# Patient Record
Sex: FEMALE | Race: WHITE | NOT HISPANIC OR LATINO | URBAN - METROPOLITAN AREA
[De-identification: names, ages, dates, MRNs, and addresses within clinical notes are randomized per-mention and may not be internally consistent; named-entity substitution may affect disease eponyms.]

---

## 2018-05-03 ENCOUNTER — OFFICE (OUTPATIENT)
Dept: URBAN - METROPOLITAN AREA CLINIC 101 | Facility: CLINIC | Age: 38
End: 2018-05-03

## 2018-05-03 VITALS
DIASTOLIC BLOOD PRESSURE: 79 MMHG | SYSTOLIC BLOOD PRESSURE: 107 MMHG | WEIGHT: 219 LBS | TEMPERATURE: 98.1 F | HEIGHT: 63 IN | HEART RATE: 84 BPM

## 2018-05-03 DIAGNOSIS — Z83.79 FAMILY HISTORY OF OTHER DISEASES OF THE DIGESTIVE SYSTEM: ICD-10-CM

## 2018-05-03 DIAGNOSIS — K62.5 HEMORRHAGE OF ANUS AND RECTUM: ICD-10-CM

## 2018-05-03 DIAGNOSIS — K62.89 OTHER SPECIFIED DISEASES OF ANUS AND RECTUM: ICD-10-CM

## 2018-05-03 PROCEDURE — 99244 OFF/OP CNSLTJ NEW/EST MOD 40: CPT

## 2018-05-03 NOTE — SERVICEHPINOTES
RAMANA SOTO   is a   38   year old    female who is being seen in consultation at the request of   LAURA TAI   for rectal bleeding. She reports rectal bleeding that began 1 month ago, occurs with every couple BMs, does not occur spontaneously. BRB when wiping. BMs 3-4x/day, BSS type 4, which is regular for her. No straining. She reports rectal pain as well, especially with sitting, radiates into buttocks. Sister has ulcerative proctitis, dx around age 38. No family hx of colon cancer. No weight loss, fever, or chills. No abdominal pain,or n/v. She takes a multivitamin, otherwise no medications no NSAIDs.BR

## 2018-05-11 ENCOUNTER — ON CAMPUS - OUTPATIENT (OUTPATIENT)
Dept: URBAN - METROPOLITAN AREA HOSPITAL 63 | Facility: HOSPITAL | Age: 38
End: 2018-05-11
Payer: COMMERCIAL

## 2018-05-11 DIAGNOSIS — K62.5 HEMORRHAGE OF ANUS AND RECTUM: ICD-10-CM

## 2018-05-11 PROCEDURE — 45380 COLONOSCOPY AND BIOPSY: CPT
